# Patient Record
Sex: FEMALE | Race: WHITE
[De-identification: names, ages, dates, MRNs, and addresses within clinical notes are randomized per-mention and may not be internally consistent; named-entity substitution may affect disease eponyms.]

---

## 2018-01-27 ENCOUNTER — HOSPITAL ENCOUNTER (EMERGENCY)
Dept: HOSPITAL 62 - ER | Age: 28
Discharge: HOME | End: 2018-01-27
Payer: MEDICAID

## 2018-01-27 VITALS — DIASTOLIC BLOOD PRESSURE: 78 MMHG | SYSTOLIC BLOOD PRESSURE: 120 MMHG

## 2018-01-27 DIAGNOSIS — R03.0: ICD-10-CM

## 2018-01-27 DIAGNOSIS — F32.9: ICD-10-CM

## 2018-01-27 DIAGNOSIS — E66.9: ICD-10-CM

## 2018-01-27 DIAGNOSIS — M79.604: Primary | ICD-10-CM

## 2018-01-27 PROCEDURE — 99283 EMERGENCY DEPT VISIT LOW MDM: CPT

## 2018-01-27 PROCEDURE — 93971 EXTREMITY STUDY: CPT

## 2018-01-27 NOTE — ER DOCUMENT REPORT
ED General





- General


Chief Complaint: Leg Pain


Stated Complaint: RIGHT LEG PAIN


Time Seen by Provider: 01/27/18 20:00


Mode of Arrival: Ambulatory


Information source: Patient


TRAVEL OUTSIDE OF THE [place] IN LAST 30 DAYS: No





- HPI


Onset: Other


Onset/Duration: Persistent


Quality of pain: Achy


Severity: Mild


Pain Level: 1


Context: 





Patient presents emergency department with reports that she thinks she has a 

blood clot in her leg.  Patient reports approximately 2 weeks ago she drove 12 

hours here to [place] from [place].  She reports she only got out to pump gas.  Patient 

also reports for the past few months she has been very depressed laying in bed 

for over 14 hours a day due to depression.  She is worried she has a blood 

clot.  She reports right calf tender to palpation.  Denies history of DVTs but 

reports mom had a blood clot after chemo.  She reports she has history of 

chronic shortness of breath due to her PVCs and obesity.


Associated symptoms: None


Exacerbated by: Denies


Relieved by: Denies


Similar symptoms previously: No


Recently seen / treated by doctor: No





- Related Data


Allergies/Adverse Reactions: 


 





No Known Allergies Allergy (Verified 01/27/18 19:22)


 











Past Medical History





- General


Information source: Patient


Last Menstrual Period: nexplan





- Social History


Smoking Status: Unknown if Ever Smoked


Cigarette use (# per day): No


Frequency of alcohol use: None


Drug Abuse: None


Lives with: Family


Family History: Other - mother with blood clots after chemo


Patient has suicidal ideation: No


Patient has homicidal ideation: No





- Past Medical History


Cardiac Medical History: Reports: Other - PVC


GI Medical History: Reports: Hx Gastroesophageal Reflux Disease


Psychiatric Medical History: Reports: Hx Depression


Past Surgical History: Reports: Hx Appendectomy





Review of Systems





- Review of Systems


Notes: 





Review HPI for review of systems., All other systems negative





Physical Exam





- Vital signs


Vitals: 


 











Temp Pulse Resp BP Pulse Ox


 


 98.4 F   87   18   139/100 H  96 


 


 01/27/18 19:26  01/27/18 19:26  01/27/18 19:26  01/27/18 19:26  01/27/18 19:26














- Notes


Notes: 





PHYSICAL EXAMINATION: 


GENERAL: Well-appearing and in no acute distress 


HEAD: Atraumatic, normocephalic. 


EYES: Pupils equal round     extraocular movements intact, sclera anicteric, 

conjunctiva are normal. 


ENT: nares patent,   Moist mucous membranes. 


NECK: Normal range of motion, supple without lymphadenopathy 


LUNGS: CTAB and equal. No wheezes rales or rhonchi. 


HEART: Regular rate and rhythm without murmurs 


ABDOMEN: Soft, no tenderness. No guarding, no rebound 


EXTREMITIES: Normal range of motion, no pitting edema. No cyanosis. right calf 

neg homans, no erythema, warmth, swelling 


NEUROLOGICAL: Cranial nerves grossly intact. Normal sensory/motor exams. 


PSYCH: Normal mood, normal affect. 


SKIN: Warm, Dry, normal turgor, no rashes or lesions noted








Course





- Vital Signs


Vital signs: 


 











Temp Pulse Resp BP Pulse Ox


 


 98.6 F   74   16   120/78   97 


 


 01/27/18 21:45  01/27/18 21:45  01/27/18 21:45  01/27/18 21:45  01/27/18 21:45














Discharge





- Discharge


Clinical Impression: 


 Leg pain, right, Elevated blood pressure reading





Condition: Stable


Disposition: HOME, SELF-CARE


Additional Instructions: 


*You have been evaluated for leg pain, elevated blood pressure reading


*The doppler was negative for a blood clot


*Stretch


*Follow up with a primary care provider for recheck within one week


*Take tylenol as indicated for pain


*Return to ED for worsening condition, changes, needs











Monitor your blood pressure. Your blood pressure was elevated today.  This may 

be because you were anxious, in pain or because you need medication.  It is 

important to follow up with your primary care provider for full evaluation.


Forms:  Elevated Blood Pressure

## 2018-01-27 NOTE — RADIOLOGY REPORT (SQ)
EXAM DESCRIPTION:  VENOUS UNILATERAL LOWER



COMPLETED DATE/TIME:  1/27/2018 9:15 pm



REASON FOR STUDY:  calf pain, recent long trip



COMPARISON:  None.



TECHNIQUE:  Dynamic and static gray scale and color images acquired of the right leg venous system. S
elected spectral images acquired with additional compression and augmentation maneuvers. The contrala
teral common femoral vein and saphenofemoral junction were also imaged. Images stored on PACS.



LIMITATIONS:  None.



FINDINGS:  COMMON FEMORAL: Normal phasicity, compression and augmentation. No visualized echogenic ma
terial on gray scale. No defects on color images.

FEMORAL: Normal compression and augmentation. No visualized echogenic material on gray scale. No defe
cts on color images.

POPLITEAL: Normal compression, augmentation. No visualized echogenic material on gray scale. No defec
ts on color images.

CALF VESSELS: Normal compression, augmentation. No visualized echogenic material on gray scale. No de
fects on color images.

GSV and SSV: Normal compression, augmentation. No visualized echogenic material on gray scale. No def
ects on color images.

ANY DEEP VENOUS INSUFFICIENCY: No

ANY EVIDENCE OF POPLITEAL CYST: No.

OTHER: No other significant finding.

CONTRALATERAL COMMON FEMORAL VEIN AND SAPHENOFEMORAL JUNCTION:

Normal phasicity, compression and augmentation. No visualized echogenic material on gray scale. No de
fects on color images.



IMPRESSION:  NO EVIDENCE OF DVT OR SVT IN THE RIGHT LEG.



TECHNICAL DOCUMENTATION:  JOB ID:  [id number]

 2011 [employer]- All Rights Reserved